# Patient Record
Sex: MALE | Race: WHITE
[De-identification: names, ages, dates, MRNs, and addresses within clinical notes are randomized per-mention and may not be internally consistent; named-entity substitution may affect disease eponyms.]

---

## 2019-06-26 ENCOUNTER — HOSPITAL ENCOUNTER (EMERGENCY)
Dept: HOSPITAL 25 - UCCORT | Age: 79
Discharge: ANOTHER HOSPITAL | End: 2019-06-26
Payer: MEDICARE

## 2019-06-26 VITALS — DIASTOLIC BLOOD PRESSURE: 67 MMHG | SYSTOLIC BLOOD PRESSURE: 136 MMHG

## 2019-06-26 DIAGNOSIS — I10: ICD-10-CM

## 2019-06-26 DIAGNOSIS — M79.602: Primary | ICD-10-CM

## 2019-06-26 PROCEDURE — 99203 OFFICE O/P NEW LOW 30 MIN: CPT

## 2019-06-26 PROCEDURE — 93005 ELECTROCARDIOGRAM TRACING: CPT

## 2019-06-26 PROCEDURE — G0463 HOSPITAL OUTPT CLINIC VISIT: HCPCS

## 2019-06-26 NOTE — XMS REPORT
Continuity of Care Document (CCD)

 Created on:May 31, 2019



Patient:Lester De Souza

Sex:Male

:1940

External Reference #:[id number]





Demographics







 Address  [address] [place] [address]

 

 Home Phone  [phone]

 

 Mobile Phone  [phone]

 

 Preferred Language  en

 

 Marital Status  Not  or 

 

 Sikhism Affiliation  Unknown

 

 Race  White

 

 Ethnic Group  Not  or 









Author







 Name  Johny Webster FNP

 

 Address  [address] [place] [id number]









Support







 Name  Relationship  Address  Phone

 

 Fany Pond  Family Member  Unavailable  [phone]

 

 Aida Pond  Domestic/Life Partner  Unavailable  [phone]









Care Team Providers







 Name  Role  Phone

 

 Johny Webster NP  Care Team Information   Unavailable

 

 Johny Webster NP  Primary Care Physician  Unavailable









Payers







 Date  Identification Numbers  Payment Provider  Subscriber

 

   Policy Number: [id number]  [employer]  Lester De Souza









 PayID: [id number]  [address]









 [place] [address]









 Expires: 2018  Policy Number: [id number]  [facility] Plan [facility]  Lester De Souza









 PayID: [id number]  [address]









 [place] [address]







Problems







 Active Problems  Provider  Date

 

 Hypothyroidism  Johny Webster FNP  Onset: 2014

 

 Benign essential hypertension  Johny Webster FNP  Onset: 2014

 

 Thrombocytopenic disorder  Amie Joseph FNP  Onset: 2014

 

 Gastroesophageal reflux disease  Amie Joseph FNP  Onset: 2014

 

 Chronic kidney disease stage 3  Johny Webster FNP  Onset: 2017









 Note: Lab: 17 - Comprehensive Metabolic Panel









 Hypercalcemia  Daxa Chin MD  Onset: 2018

 

 Gastro-esophageal reflux disease with  Julius Kee MD,FACS  Onset: 2018



 esophagitis    

 

 Diaphragmatic hernia  Herve Ackerman MD  Onset: 2018

 

 Heartburn  Julius Kee MD,FACS  Onset: 2018









 Resolved Problems









 Disorder of peritoneum  Amie Joseph FNP  Onset: 2014









 Resolved: 2015









 Decreased renal function  Johny Webster CRISTOFER  Onset: 2016









 Resolved: 2017

 

 Note: BUN 27, creatine 1.5







Family History







 Date  Family Member(s)  Observation  Comments

 

   General  Non Contributory  

 

   Father  Multiple Sclerosis  

 

   Mother  No Current Problems  







Social History







 Type  Date  Description  Comments

 

 Birth Sex    Unknown  

 

 Marital Status    Single  

 

 Lives With    Wife  signficant other- is



       not 

 

 Home Environment    Lives With  girl friend

 

 Diet    Patient follows no  



     dietary restrictions  

 

 Occupation    Retired  

 

 Occupation    [employer]  

 

 Work Status    Retired  

 

 Tobacco Use  Start: Unknown End:  Former Cigarette Smoker  started age 16 - 30 
+



   Unknown    pack year history -



       now smoking 8+ pipes



       a day

 

 Smoking Status  Reviewed: 19  Former Cigarette Smoker  started age 16 - 
30 +



       pack year history -



       now smoking 8+ pipes



       a day

 

 Smokeless Tobacco    Former Smokeless  



     Tobacco User, Used 10  



     Times Daily  

 

 ETOH Use    Occasionally consumes  drink a few times a



     alcohol  month

 

 Tobacco Use  Start: Unknown  Patient is a current  



     smoker, smokes every  



     day  

 

 Recreational Drug Use    Denies Drug Use  

 

 Tobacco Use  Start: Unknown  smokes a pipe  







Allergies, Adverse Reactions, Alerts







 Description

 

 No Known Drug Allergies







Medications







 Active Medications  SIG  Qnty  Indications  Ordering  Date



         Provider  

 

 Omeprazole  1 by mouth every  30caps  K21.9  Tyron,  



      20mg Capsules DR rochelle Edgar MD  9



           

 

 Losartan Potassium  1 by mouth every  90tabs    Gallito Perez,  



              50mg Tablets  day      MD  8



           

 

 Levothyroxine Sodium  1 by mouth every  30tabs  E03.9  [place],  



                75mcg  day      Jenniferleigh,  7



 Tablets        FNP  

 

 Viagra  use one hour prior  9tabs    [facility],  



  100mg Tablets  to sexual      Jennifer[place],  6



   intercourse      FNP  

 

 Metoprolol Succinate ER  1 by mouth every  30tabs    [place],  



                   50mg  day      Jenniferleigh,  4



 Tablets ER 24HR        FNP  



           

 

 Hydrochlorothiazide  1 by mouth every  30tabs    [place],  



               25mg  day      Jenniferleigh,  0



 Tablets        FNP  

 

 Dok  take one capsule  60caps    [place],  



 100mg Capsules  by mouth twice a      Jenniferleigh,  0



   day as needed      FNP  

 

 Miralax  1 packet by mouth      Unknown  



   3350NF Packet  daily as needed        0



   for constipation        

 

 Ranitidine 150 Maximum  1 tab by mouth  30tabs    [place],  



 Strength  once a day      Jenniferleigh,  0



    150mg Tablets        FNP  



           









 History Medications









 Omeprazole  take 1 capsule by  30caps  K22.10  Gallito Perez  2018 -



          40mg  mouth once daily      MD  2019



 Capsules DR Burgess  1 by mouth every day  90tabs    Elsy,  2018 -



         80mg Tablets        MD Daxa  2018



           

 

 Miralax  1 tablespoon in 8  510gm  K59.00  [place],  2018 -



       3350NF Powder  ounces of water once      [facility]  2018



   to twice  a day      , FNP  

 

 Colace  one once a day for  90caps  K59.00  [place],  2018 -



      100mg Capsules  constipation      [facility]  2018



         , FNP  

 

 Ranitidine HCL  1 by mouth twice  60tabs    [place],  05/10/2017 -



              150mg  daily for acid      [facility]  2018



 Tablets  reflux      , FNP  



           

 

 Levothyroxine Sodium  take 1 tablet by  90tabs  E03.9  [place],  2016 -



   mouth once daily      [facility]  2017



 50mcg Tablets        , FNP  



           

 

 Omeprazole  1 by mouth every day  90caps  K21.9  [place],  2016 -



          40mg        [facility]  05/10/2017



 Capsules         , FNP  



           

 

 Zostavax  to be given  1units    [place],  10/22/2015 -



         [facility]  Unknown



 57512Ebe/0.65ML        , FNP  



 Solution Rec          



           

 

 Cialis  1 by mouth every day  4tabs  N52.2  [place],  2015 -



      5mg Tablets  (*in place of      Jennifer[place]  Unknown



   viagra*)      , FNP  









 Z76.0









 Hydrochlorothiazide  take 1 tablet by  90tabs  I10  [place],  2015 -



                 25mg  mouth once daily      [facility],  2018



 Tablets        FNP  



           

 

 Prednisone  3 tabs daily  10tabs    [place]  2015 -



        20mg Tablets        MD Analia  2015



           

 

 Viagra  one tablet by  6tabs  607.84  Adan,  2014 -



    50mg Tablets  mouth 1 hour      MD Analia  2015



   prior to sexual        



   intercourse        

 

 Naproxen Sodium  otc as per      [place],  2014 -



             220mg  package      MD Analia  10/22/2015



 Capsules  directions as        



   needed        

 

 Calcium 500/D  2 tablets po qday  60tabs    [place],  2014 -



         MD Analia  2018



 500-200mg-Unit Tablets          



           

 

 Ferrous Sulfate  1 po bid  30tabs    [place],  2014 -



             325(65Fe)        MD Analia  Unknown



 mg Tablets          



           

 

 Levothyroxine Sodium  1 by mouth every  30tabs  E03.9  [place],  2014 -



                  25mcg  rochelle Mcclain,  2016



 Tablets        FNP  



           

 

 Omeprazole  1 by mouth every  90caps  K21.9  [facility],  2014 -



        20mg Capsules DR rochelle Mcclain,  2016



         FNP  

 

 Sennosides  1po bid  120tabs    Adan,  2014 -



        8.6mg Tablets        MD Analia  Unknown



           

 

 Prednisone  PO Daily - as per      Unknown   -



        5mg Tablets  Hem/Onc 5/19/15        2015



           

 

 Calcium 600+D High  2 by mouth every      Unknown   -



 Potency  day->holding due        2018



     600-400mg-Unit  to hypercalcemia        



 Tablets          



           







Immunizations







 CPT Code  Status  Date  Vaccine  Lot #

 

 [id number]  Given  2018  Influenza High Dose  [id number]

 

 99210  Given  2017  Influenza High Dose  [id number]

 

   Given  2016  Influenza Vaccine (Fluzone) Age 3 And Older  [id number]

 

 77962  Given  2016  Pneumococcal Conjugate Vaccine 13 Valent For  [id number]



       Intramuscular Use  

 

 20793  Given  10/22/2015  Pneumovax Injection  [id number]

 

   Given  10/12/2015  Influenza Vaccine (Fluzone) Age 3 And Older  [id number]







Vital Signs







 Date  Vital  Result  Comment

 

 2019 10:02am  BP Systolic  90 mmHg  









 BP Diastolic  62 mmHg  

 

 Heart Rate  86 /min  

 

 Respiratory Rate  18 /min  

 

 Height  68 inches  5'8"

 

 Weight  206.00 lb  

 

 BMI (Body Mass Index)  31.3 kg/m2  

 

 BSA (Body Surface Area)  2.07 m2  

 

 Ideal body weight in kilograms  70 kg  

 

 O2 % BldC Oximetry  98 %  









 2019  8:35am  BP Systolic Sitting Left Arm  100 mmHg  









 BP Diastolic Sitting Left Arm  56 mmHg  

 

 Heart Rate  78 /min  

 

 Respiratory Rate  16 /min  

 

 Height  68 inches  5'8"

 

 Weight  206.00 lb  

 

 BMI (Body Mass Index)  31.3 kg/m2  

 

 BSA (Body Surface Area)  2.07 m2  

 

 Ideal body weight in kilograms  70 kg  

 

 O2 % BldC Oximetry  98 %  Ra









 2019  9:54am  BP Systolic Sitting Right Arm  110 mmHg  









 BP Diastolic Sitting Right Arm  68 mmHg  

 

 Body Temperature  97.2 F  

 

 Heart Rate  63 /min  

 

 Respiratory Rate  18 /min  

 

 Height  68 inches  5'8"

 

 Weight  206.00 lb  

 

 BMI (Body Mass Index)  31.3 kg/m2  

 

 BSA (Body Surface Area)  2.07 m2  

 

 Ideal body weight in kilograms  70 kg  

 

 O2 % BldC Oximetry  97 %  Ra









 2018  2:46pm  BP Systolic  119 mmHg  









 BP Diastolic  72 mmHg  

 

 Heart Rate  66 /min  

 

 Respiratory Rate  17 /min  

 

 Height  68 inches  5'8"

 

 Weight  202.00 lb  

 

 BMI (Body Mass Index)  30.7 kg/m2  

 

 BSA (Body Surface Area)  2.05 m2  

 

 Ideal body weight in kilograms  70 kg  

 

 O2 % BldC Oximetry  93 %  









 2018  2:38pm  BP Systolic Sitting Right Arm  122 mmHg  









 BP Diastolic Sitting Right Arm  70 mmHg  

 

 Body Temperature  97.4 F  

 

 Heart Rate  67 /min  

 

 Respiratory Rate  22 /min  

 

 Height  68 inches  5'8"

 

 Weight  199.12 lb  

 

 BMI (Body Mass Index)  30.3 kg/m2  

 

 BSA (Body Surface Area)  2.04 m2  

 

 Ideal body weight in kilograms  70 kg  

 

 O2 % BldC Oximetry  90 %  









 2018  8:28am  BP Systolic Sitting Left Arm  110 mmHg  









 BP Diastolic Sitting Left Arm  60 mmHg  

 

 Heart Rate  72 /min  

 

 Respiratory Rate  16 /min  

 

 Height  68 inches  5'8"

 

 Weight  201.00 lb  

 

 BMI (Body Mass Index)  30.6 kg/m2  

 

 BSA (Body Surface Area)  2.05 m2  

 

 Ideal body weight in kilograms  70 kg  









 10/10/2018 10:25am  BP Systolic Sitting Left Arm  110 mmHg  









 BP Diastolic Sitting Left Arm  68 mmHg  

 

 Body Temperature  97.8 F  

 

 Heart Rate  61 /min  

 

 Height  68 inches  5'8"

 

 Weight  194.00 lb  

 

 BMI (Body Mass Index)  29.5 kg/m2  

 

 BSA (Body Surface Area)  2.02 m2  

 

 Ideal body weight in kilograms  70 kg  

 

 O2 % BldC Oximetry  97 %  









 2018 10:03am  BP Systolic Sitting Right Arm  116 mmHg  









 BP Diastolic Sitting Right Arm  70 mmHg  

 

 Heart Rate  67 /min  

 

 Respiratory Rate  14 /min  

 

 Height  68 inches  5'8"

 

 Weight  192.50 lb  

 

 BMI (Body Mass Index)  29.3 kg/m2  

 

 BSA (Body Surface Area)  2.01 m2  

 

 Ideal body weight in kilograms  70 kg  

 

 O2 % BldC Oximetry  97 %  









 2018  8:41am  BP Systolic Sitting Left Arm  103 mmHg  









 BP Diastolic Sitting Left Arm  64 mmHg  

 

 Heart Rate  65 /min  

 

 Respiratory Rate  16 /min  

 

 Height  68 inches  5'8"

 

 Weight  189.00 lb  

 

 BMI (Body Mass Index)  28.7 kg/m2  

 

 BSA (Body Surface Area)  2.00 m2  

 

 Ideal body weight in kilograms  70 kg  









 2018  8:54am  BP Systolic Sitting Left Arm  100 mmHg  









 BP Diastolic Sitting Left Arm  60 mmHg  

 

 Heart Rate  80 /min  

 

 Respiratory Rate  16 /min  

 

 Height  68 inches  5'8"

 

 Weight  188.00 lb  

 

 BMI (Body Mass Index)  28.6 kg/m2  

 

 BSA (Body Surface Area)  1.99 m2  

 

 Ideal body weight in kilograms  70 kg  









 05/10/2018 11:00am  BP Systolic Sitting Left Arm  110 mmHg  









 BP Diastolic Sitting Left Arm  64 mmHg  

 

 Heart Rate  70 /min  

 

 Respiratory Rate  16 /min  

 

 Height  68 inches  5'8"

 

 Weight  194.00 lb  

 

 BMI (Body Mass Index)  29.5 kg/m2  

 

 BSA (Body Surface Area)  2.02 m2  

 

 Ideal body weight in kilograms  70 kg  









 2018 10:44am  BP Systolic Sitting Left Arm  118 mmHg  









 BP Diastolic Sitting Left Arm  64 mmHg  

 

 Heart Rate  76 /min  

 

 Respiratory Rate  18 /min  

 

 Height  68 inches  5'8"

 

 Weight  197.50 lb  

 

 BMI (Body Mass Index)  30.0 kg/m2  

 

 BSA (Body Surface Area)  2.03 m2  

 

 Ideal body weight in kilograms  70 kg  









 2018 11:38am  BP Systolic Sitting Left Arm  115 mmHg  









 BP Diastolic Sitting Left Arm  56 mmHg  

 

 Heart Rate  67 /min  

 

 Respiratory Rate  12 /min  

 

 Height  68 inches  5'8"

 

 Weight  199.00 lb  

 

 BMI (Body Mass Index)  30.3 kg/m2  

 

 BSA (Body Surface Area)  2.04 m2  

 

 Ideal body weight in kilograms  70 kg  









 2018  3:00pm  BP Systolic Sitting Right Arm  132 mmHg  









 BP Diastolic Sitting Right Arm  79 mmHg  

 

 Heart Rate  82 /min  

 

 Respiratory Rate  16 /min  

 

 Height  68 inches  5'8"

 

 Weight  196.00 lb  

 

 BMI (Body Mass Index)  29.8 kg/m2  

 

 BSA (Body Surface Area)  2.03 m2  

 

 Ideal body weight in kilograms  70 kg  









 2018 10:11am  BP Systolic Sitting Left Arm  128 mmHg  









 BP Diastolic Sitting Left Arm  72 mmHg  

 

 Heart Rate  80 /min  

 

 Respiratory Rate  18 /min  

 

 Height  68 inches  5'8"

 

 Weight  201.12 lb  

 

 BMI (Body Mass Index)  30.6 kg/m2  

 

 BSA (Body Surface Area)  2.05 m2  

 

 Ideal body weight in kilograms  70 kg  









 2018  8:29am  BP Systolic Sitting Left Arm  124 mmHg  









 BP Diastolic Sitting Left Arm  72 mmHg  

 

 Heart Rate  72 /min  

 

 Respiratory Rate  18 /min  

 

 Height  68 inches  5'8"

 

 Weight  198.38 lb  

 

 BMI (Body Mass Index)  30.2 kg/m2  

 

 BSA (Body Surface Area)  2.04 m2  

 

 Ideal body weight in kilograms  70 kg  









 2017  1:32pm  BP Systolic Sitting Left Arm  120 mmHg  









 BP Diastolic Sitting Left Arm  78 mmHg  

 

 Heart Rate  66 /min  

 

 Height  68 inches  5'8"

 

 Weight  203.00 lb  

 

 BMI (Body Mass Index)  30.9 kg/m2  

 

 BSA (Body Surface Area)  2.06 m2  

 

 Ideal body weight in kilograms  70 kg  









 05/10/2017  1:20pm  BP Systolic  131 mmHg  









 BP Diastolic  76 mmHg  

 

 Body Temperature  97.6 F  

 

 Heart Rate  77 /min  

 

 Respiratory Rate  20 /min  

 

 Height  68 inches  5'8"

 

 Weight  200.00 lb  with boots

 

 BMI (Body Mass Index)  30.4 kg/m2  

 

 BSA (Body Surface Area)  2.04 m2  

 

 Ideal body weight in kilograms  70 kg  

 

 O2 % BldC Oximetry  97 %  









 2016  1:07pm  BP Systolic Sitting Left Arm  124 mmHg  









 BP Diastolic Sitting Left Arm  74 mmHg  

 

 Heart Rate  76 /min  

 

 Respiratory Rate  18 /min  

 

 Height  68 inches  5'8"

 

 Weight  197.00 lb  

 

 BMI (Body Mass Index)  30.0 kg/m2  

 

 BSA (Body Surface Area)  2.03 m2  

 

 Ideal body weight in kilograms  70 kg  









 2016  1:06pm  BP Systolic Sitting Left Arm  126 mmHg  









 BP Diastolic Sitting Left Arm  70 mmHg  

 

 Heart Rate  72 /min  

 

 Respiratory Rate  18 /min  

 

 Height  68 inches  5'8"

 

 Weight  184.00 lb  

 

 BMI (Body Mass Index)  28.0 kg/m2  

 

 BSA (Body Surface Area)  1.97 m2  

 

 Ideal body weight in kilograms  70 kg  









 2016  1:14pm  BP Systolic  124 mmHg  









 BP Diastolic  76 mmHg  

 

 Height  68 inches  5'8"

 

 Weight  197.38 lb  

 

 BMI (Body Mass Index)  30.0 kg/m2  

 

 BSA (Body Surface Area)  2.03 m2  









 2015  8:43am  BP Systolic Sitting Left Arm  134 mmHg  









 BP Diastolic Sitting Left Arm  82 mmHg  

 

 Heart Rate  79 /min  

 

 Respiratory Rate  18 /min  

 

 Height  68 inches  5'8"

 

 Weight  210.00 lb  

 

 BMI (Body Mass Index)  31.9 kg/m2  

 

 BSA (Body Surface Area)  2.09 m2  









 10/22/2015  2:17pm  BP Systolic  126 mmHg  









 BP Diastolic  76 mmHg  

 

 Heart Rate  78 /min  

 

 Respiratory Rate  18 /min  

 

 Height  68 inches  5'8"

 

 Weight  204.00 lb  

 

 BMI (Body Mass Index)  31.0 kg/m2  

 

 BSA (Body Surface Area)  2.06 m2  









 2015  1:10pm  BP Systolic  152 mmHg  148/88 on repeat, sitt









 BP Diastolic  88 mmHg  148/88 on repeat, sitt

 

 Heart Rate  64 /min  

 

 Respiratory Rate  20 /min  

 

 Weight  206.50 lb  









 2015  1:47pm  BP Systolic  114 mmHg  









 BP Diastolic  72 mmHg  

 

 Weight  208.00 lb  









 2014  9:36am  BP Systolic  134 mmHg  









 BP Diastolic  86 mmHg  

 

 Weight  194.00 lb  









 2014 11:19am  BP Systolic  118 mmHg  









 BP Diastolic  76 mmHg  

 

 Weight  194.00 lb  









 2014  1:06pm  BP Systolic  126 mmHg  









 BP Diastolic  74 mmHg  

 

 Weight  194.00 lb  









 2014  1:08pm  BP Systolic  126 mmHg  









 BP Diastolic  74 mmHg  

 

 Heart Rate  80 /min  

 

 Respiratory Rate  18 /min  

 

 Height  68 inches  5'8"

 

 Weight  191.00 lb  









 2014  1:19pm  BP Systolic  130 mmHg  









 BP Diastolic  70 mmHg  

 

 Body Temperature  98.7 F  

 

 Heart Rate  60 /min  

 

 Height  68 inches  5'8"









 2014  1:43pm  BP Systolic  160 mmHg  









 BP Diastolic  100 mmHg  

 

 Body Temperature  99.1 F  

 

 Heart Rate  80 /min  

 

 Height  68 inches  5'8"

 

 Weight  179.00 lb  









 2014  1:20pm  BP Systolic  150 mmHg  









 BP Diastolic  80 mmHg  

 

 Body Temperature  99.0 F  

 

 Heart Rate  80 /min  

 

 Height  68 inches  5'8"

 

 Weight  186.00 lb  







Results







 Test  Date  Facility  Test  Result  H/L  Range  Note

 

 Basic Metabolic Panel  2019  [facility]  Glucose  104 mg/dL  N    1



     [address] [place], [place] [id number] [phone]          









 BUN  26 mg/dL  High  7-18  

 

 Creatinine  1.6 mg/dL  High  0.6-1.3  

 

 Glom Filtration Rate, Estimate  45 mL/min    >60  

 

 If   54 mL/min    >60  2

 

 BUN/Creat  16.2 ratio      

 

 Sodium  139 mmol/L  N  136-145  

 

 Potassium  4.5 mmol/L  N  3.5-5.1  

 

 Chloride  105 mmol/L  N    

 

 Carbon Dioxide  27 mmol/L  N  21-32  

 

 Anion Gap  7 mEq/L  Low  8-16  

 

 Calcium  9.3 mg/dL  N  8.5-10.1  









 Laboratory test  2019  [facility]  Phosphorous  2.7 mg/dL  N  2.5-4.0  



 finding    [address] [place], [place] [address] [phone]          









 Magnesium  2.4 mg/dL  N  1.8-2.4  









 Protein/Creatinine  2019  [facility]  Creatinine,Urine  130.8    Not  



 Ratio,Urine    [address]    mg/dL    [facility].  



     [place], [place] [address] [phone]          









 Protein,Total,Urine  11.4 mg/dL    Not Estab.  

 

 Protein/Creatinine Ratio  87 MG/GCRE    0-200  3









 Ua RFX Micro & Culture  2019  [facility]  Urine Color  YELLOW    Yellow  



 II    [address] [place], [place] [address] [phone]          









 Urine Clarity  CLEAR    Clear  

 

 Urine Glucose - Dipstick  NEGATIVE mg/dL    Negative  

 

 Urine Bilirubin - Dipstick  NEGATIVE    Negative  

 

 Urine Ketone  NEGATIVE mg/dL    Negative  

 

 Urine Specific Gravity  1.020  N  1.010-1.030  

 

 Urine Blood  NEGATIVE    Negative  

 

 Urine PH  6.0  Low  6.5-7.5  

 

 Urine Protein - Dipstick  NEGATIVE mg/dL    Negative  

 

 Urine Urobilinogen - Dipstick  0.2 E.U./dL  N  0.2-1.0  

 

 Urine Nitrite - Dipstick  NEGATIVE    Negative  

 

 Urine Leuk Esterase  NEGATIVE    Negative  

 

 Source:  URINE, CLEAN CAT <SEE NOTE>      4









 Laboratory test  2019  [facility]  Vitamin  30.5    30.0-100.0  5



 finding    [address]  D,25-Hydroxy  ng/mL      



     [place] [address]          



     [phone]          

 

 Laboratory test  2019  [facility] [address]  Thyroid Stim  4.05  N  0.30-
4.20  6



 finding    [address]  Hormone  uIU/mL      



     [place] [address]          



     [phone]          

 

 Direct LDL  2019  [facility] [address]  LDL Chol.  115  High  0-99  7



 Cholesterol    [address]  (Direct)  mg/dL      



     [place] [address]          



     [phone]          

 

 Comprehensive  10/29/2018  [facility]  Glucose  107  High    



 Metabolic Panel    [address]    mg/dL      



     [place] [address]          



     [phone]          









 BUN  22 mg/dL  High  7-18  

 

 Creatinine  1.5 mg/dL  High  0.6-1.3  

 

 Glom Filtration Rate, Estimate  48 mL/min    >60  

 

 If   58 mL/min    >60  8

 

 BUN/Creat  14.6 ratio      

 

 Sodium  141 mmol/L  N  136-145  

 

 Potassium  4.2 mmol/L  N  3.5-5.1  

 

 Chloride  108 mmol/L  High    

 

 Carbon Dioxide  26 mmol/L  N  21-32  

 

 Anion Gap  7 mEq/L  Low  8-16  

 

 Calcium  8.6 mg/dL  N  8.5-10.1  

 

 Total Protein  7.2 g/dL  N  6.4-8.2  

 

 Albumin  3.7 g/dL  N  3.4-5.0  

 

 Globulin  3.5 g/dL  N  1.9-4.3  

 

 Alb/Glob  1.1 ratio      

 

 Bilirubin,Total  0.5 mg/dL  N  0.2-1.0  

 

 Sgot/Ast  19 U/L  N  15-37  

 

 SGPT/Alt  27 U/L  N  12-78  

 

 Alkaline Phosphatase  51 U/L  N    









 Laboratory test  10/29/2018  [facility]  Vitamin  33.4 ng/mL    30.0-100.0  9



 finding    [address]  D,25-Hydroxy        



     [place] [address]          



     [phone]          

 

 Ua RFX Micro &  10/29/2018  [facility]  Urine Color  YELLOW    Yellow  



 Culture II    [address]          



     [place] [address] [phone]          









 Urine Clarity  CLEAR    Clear  

 

 Urine Glucose - Dipstick  NEGATIVE mg/dL    Negative  

 

 Urine Bilirubin - Dipstick  NEGATIVE    Negative  

 

 Urine Ketone  NEGATIVE mg/dL    Negative  

 

 Urine Specific Gravity  1.015  N  1.010-1.030  

 

 Urine Blood  NEGATIVE    Negative  

 

 Urine PH  6.5  N  6.5-7.5  

 

 Urine Protein - Dipstick  NEGATIVE mg/dL    Negative  

 

 Urine Urobilinogen - Dipstick  0.2 E.U./dL  N  0.2-1.0  

 

 Urine Nitrite - Dipstick  NEGATIVE    Negative  

 

 Urine Leuk Esterase  NEGATIVE    Negative  









 Protein/Creatinine  10/29/2018  [facility]  Creatinine,Urine  123.2    Not  



 Ratio,Urine    [address]    mg/dL    [place]  



     [place] [address] [phone]          









 Protein,Total,Urine  10.8 mg/dL    Not Estab.  

 

 Protein/Creatinine Ratio  88 MG/GCRE    0-200  10









 Laboratory test  10/29/2018  [facility]  Phosphorous  2.6 mg/dL  N  2.5-4.0  



 finding    [address]          



     [place] [address] [phone]          









 PTH,Intact  36 pg/mL    15-65  11

 

 Magnesium  2.2 mg/dL  N  1.8-2.4  









 Basic Metabolic Panel  2018  [facility]  Glucose  89 mg/dL  N    



     [address]          



     [place] [address] [phone]          









 BUN  22 mg/dL  High  7-18  

 

 Creatinine  1.4 mg/dL  High  0.6-1.3  

 

 Glom Filtration Rate, Estimate  52 mL/min    >60  

 

 If African American  >60 mL/min    >60  12

 

 BUN/Creat  15.7 ratio      

 

 Sodium  142 mmol/L  N  136-145  

 

 Potassium  4.3 mmol/L  N  3.5-5.1  

 

 Chloride  108 mmol/L  High    

 

 Carbon Dioxide  27 mmol/L  N  21-32  

 

 Anion Gap  7 mEq/L  Low  8-16  

 

 Calcium  8.9 mg/dL  N  8.5-10.1  









 Laboratory test  2018  [facility]  Vitamin  40.6    30.0-100.0  13,



 finding    [address]  D,25-Hydroxy  ng/mL      14



     [place] [address] [phone]          

 

 Comprehensive  2018  [facility]  Glucose  93 mg/dL  N    



 Metabolic Panel    [address]          



     [place] [address] [phone]          









 BUN  23 mg/dL  High  7-18  

 

 Creatinine  1.5 mg/dL  High  0.6-1.3  

 

 Glom Filtration Rate, Estimate  48 mL/min    >60  

 

 If   58 mL/min    >60  15

 

 BUN/Creat  15.3 ratio      

 

 Sodium  139 mmol/L  N  136-145  

 

 Potassium  4.1 mmol/L  N  3.5-5.1  

 

 Chloride  104 mmol/L  N    

 

 Carbon Dioxide  29 mmol/L  N  21-32  

 

 Anion Gap  6 mEq/L  Low  8-16  

 

 Calcium  10.3 mg/dL  High  8.5-10.1  

 

 Total Protein  7.2 g/dL  N  6.4-8.2  

 

 Albumin  4.0 g/dL  N  3.4-5.0  

 

 Globulin  3.2 g/dL  N  1.9-4.3  

 

 Alb/Glob  1.3 ratio      

 

 Bilirubin,Total  0.5 mg/dL  N  0.2-1.0  

 

 Sgot/Ast  19 U/L  N  15-37  

 

 SGPT/Alt  21 U/L  N  12-78  

 

 Alkaline Phosphatase  46 U/L  N    









 Ua RFX Micro & Culture  2018  [facility]  Urine Color  YELLOW    Yellow  



 II    [address] [place] [address] [phone]          









 Urine Clarity  CLEAR    Clear  

 

 Urine Glucose - Dipstick  NEGATIVE mg/dL    Negative  

 

 Urine Bilirubin - Dipstick  NEGATIVE    Negative  

 

 Urine Ketone  NEGATIVE mg/dL    Negative  

 

 Urine Specific Gravity  1.010  N  1.010-1.030  

 

 Urine Blood  NEGATIVE    Negative  

 

 Urine PH  7.5  N  6.5-7.5  

 

 Urine Protein - Dipstick  NEGATIVE mg/dL    Negative  

 

 Urine Urobilinogen - Dipstick  0.2 E.U./dL  N  0.2-1.0  

 

 Urine Nitrite - Dipstick  NEGATIVE    Negative  

 

 Urine Leuk Esterase  NEGATIVE    Negative  

 

 Source:  URINE, CLEAN CAT <SEE NOTE>      16









 Protein/Creatinine  2018  [facility]  Creatinine,Urine  86.1    Not  



 Ratio,Urine    [address]    mg/dL    Estab.  



     [place] [address] [phone]          









 Protein,Total,Urine  10.8 mg/dL    Not Estab.  

 

 Protein/Creatinine Ratio  125 MG/GCRE    0-200  17









 Laboratory test  2018  [facility]  Phosphorous  3.5 mg/dL  N  2.5-4.0  



 finding    [address]          



     [place] [address]          



     [phone]          









 PTH,Intact  16 pg/mL    15-65  18

 

 Magnesium  2.0 mg/dL  N  1.8-2.4  









 Laboratory test  2018  [facility]  Thyroid Stim  2.98 uIU/mL  N  
0.30-4.20  



 finding    [address] [place] [address]  Hormone        



     [place] [address]          



     [phone]          

 

 Basic Metabolic  2018  [facility] [address]  Glucose  101 mg/dL  N    



 Panel    [address] [place] [address]          



     [phone]          









 BUN  25 mg/dL  High  7-18  

 

 Creatinine  1.7 mg/dL  High  0.6-1.3  

 

 Glom Filtration Rate, Estimate  42 mL/min    >60  

 

 If   51 mL/min    >60  19

 

 BUN/Creat  14.7 ratio      

 

 Sodium  142 mmol/L  N  136-145  

 

 Potassium  4.2 mmol/L  N  3.5-5.1  

 

 Chloride  106 mmol/L  N    

 

 Carbon Dioxide  30 mmol/L  N  21-32  

 

 Anion Gap  6 mEq/L  Low  8-16  

 

 Calcium  10.2 mg/dL  High  8.5-10.1  









 Laboratory test  2017  [facility] [address]  Thyroid  5.87  High  0.30-4.20  
20



 finding    [address] [place] [address]  Stim  uIU/mL      



     [place] [address]  Hormone        



     [phone]          

 

 Comprehensive  2017  [facility] [address]  Glucose  83 mg/dL  N    



 Metabolic Panel    [address] [place] [id number]          



     [phone]          









 BUN  28 mg/dL  High  7-18  

 

 Creatinine  1.5 mg/dL  High  0.6-1.3  

 

 Glom Filtration Rate, Estimate  48 mL/min    >60  

 

 If   58 mL/min    >60  21

 

 BUN/Creat  18.6 ratio      

 

 Sodium  138 mmol/L  N  136-145  

 

 Potassium  4.1 mmol/L  N  3.5-5.1  

 

 Chloride  107 mmol/L  N    

 

 Carbon Dioxide  26 mmol/L  N  21-32  

 

 Anion Gap  5 mEq/L  Low  8-16  

 

 Calcium  9.0 mg/dL  N  8.5-10.1  

 

 Total Protein  7.5 g/dL  N  6.4-8.2  

 

 Albumin  3.9 g/dL  N  3.4-5.0  

 

 Globulin  3.6 g/dL  N  1.9-4.3  

 

 Alb/Glob  1.1 ratio      

 

 Bilirubin,Total  0.4 mg/dL  N  0.2-1.0  

 

 Sgot/Ast  17 U/L  N  15-37  

 

 SGPT/Alt  26 U/L  N  12-78  

 

 Alkaline Phosphatase  46 U/L  N    









 CBS W/Automated Diff  2017  [facility] [address]  White Blood  8.4 K/uL  N  
3.4-10.5  



     [address]  Count        



     [place] [address] [phone]          









 Red Blood Count  5.14 M/uL  N  4.20-5.80  

 

 Hemoglobin  14.2 gm/dL  N  12.8-17.0  

 

 Hematocrit  43.7 %  N  38.0-48.0  

 

 Mean Cell Volume  85.0 fl  N  80.0-96.0  

 

 Mean Corpuscular HGB  27.6 pg  N  27.0-33.0  

 

 Mean Corpuscular HGB Conc  32.5 g/dL  N  31.7-36.0  

 

 Platelet Count  223 K/uL  N  150-400  

 

 Red Cell Distri Width SD  46.8 fl  N  36-51  

 

 Red Cell Distri Width %CV  15.3 %  N  11.6-15.8  

 

 Mean Platelet Volume  10.5 fL  N  6.6-10.6  

 

 Neut%  49.1 %  N  33.0-73.0  

 

 Lymph %  31.2 %  N  20.0-42.0  

 

 Mono %  14.1 %  High  0.0-10.0  

 

 Eo%  5.0 %  N  0.0-6.6  

 

 Bas%  0.6 %  N  0.0-1.1  

 

 Neut#  4.15 K/uL  N  1.8-7.0  

 

 Lymph #  2.63 K/uL  N  1.0-4.0  

 

 Thurston #  1.19 K/uL  High  0.0-0.8  

 

 Eos #  0.42 K/uL  N  0.0-0.5  

 

 Baso #  0.05 K/uL  N  0.0-0.1  









 Basic Metabolic Panel  05/10/2017  [facility]  Glucose  148 mg/dL  High    22



     [address]          



     [place] [address] [phone]          









 BUN  24 mg/dL  High  7-18  

 

 Creatinine  1.6 mg/dL  High  0.6-1.3  

 

 Glom Filtration Rate, Estimate  45 mL/min    >60  

 

 If   54 mL/min    >60  23

 

 BUN/Creat  15.0 ratio      

 

 Sodium  141 mmol/L  N  136-145  

 

 Potassium  3.5 mmol/L  N  3.5-5.1  

 

 Chloride  105 mmol/L  N    

 

 Carbon Dioxide  25 mmol/L  N  21-32  

 

 Anion Gap  11 mEq/L  N  8-16  

 

 Calcium  9.1 mg/dL  N  8.5-10.1  









 Glycohemoglobin A1c  05/10/2017  [facility]  Glycohemoglobin  6.1 %  N  4.2-6.3  24



     [address]  (A1c)        



     [place], [place] [id number] [phone]          









 eAG  128 mg/dL      









 Ua RFX Micro & Culture  05/10/2017  [facility]  Urine Color  YELLOW    Yellow  25



 II    [address]          



     [place], [place] [id number] [phone]          









 Urine Clarity  CLEAR    Clear  

 

 Urine Glucose - Dipstick  NEGATIVE mg/dL    Negative  

 

 Urine Bilirubin - Dipstick  NEGATIVE    Negative  

 

 Urine Ketone  NEGATIVE mg/dL    Negative  

 

 Urine Specific Gravity  1.020  N  1.010-1.030  

 

 Urine Blood  NEGATIVE    Negative  

 

 Urine PH  6.0  Low  6.5-7.5  

 

 Urine Protein - Dipstick  NEGATIVE mg/dL    Negative  

 

 Urine Urobilinogen - Dipstick  0.2 E.U./dL  N  0.2-1.0  

 

 Urine Nitrite - Dipstick  NEGATIVE    Negative  

 

 Urine Leuk Esterase  NEGATIVE    Negative  

 

 Source:  URINE, CLEAN CAT <SEE NOTE>      26









 Laboratory test  05/10/2017  [facility]  Urine  12 mg/dL      



 finding    [address]  Protein,Random        



     [place], [place] [id number] [phone]          

 

 Protein/Creatin  05/10/2017  [facility]  Creatinine,Urine  123.6    Not  



 ine Ratio,Urine    [address]    mg/dL    Estab.  



     [place], [place] [id number] [phone]          









 Protein,Total,Urine  9.9 mg/dL    Not Estab.  

 

 Protein/Creatinine Ratio  80 MG/GCRE    0-200  27









 Comprehensive  2017  [facility] [address]  Glucose  108 mg/dL  High    
28



 Metabolic Panel    [address]          



     [place], [place] [id number] [phone]          









 BUN  22 mg/dL  High  7-18  

 

 Creatinine  1.6 mg/dL  High  0.6-1.3  

 

 Glom Filtration Rate, Estimate  45 mL/min    >60  

 

 If   54 mL/min    >60  29

 

 BUN/Creat  13.7 ratio      

 

 Sodium  141 mmol/L  N  136-145  

 

 Potassium  4.1 mmol/L  N  3.5-5.1  

 

 Chloride  107 mmol/L  N    

 

 Carbon Dioxide  26 mmol/L  N  21-32  

 

 Anion Gap  8 mEq/L  N  8-16  

 

 Calcium  9.1 mg/dL  N  8.5-10.1  

 

 Total Protein  7.4 g/dL  N  6.4-8.2  

 

 Albumin  3.9 g/dL  N  3.4-5.0  

 

 Globulin  3.5 g/dL  N  1.9-4.3  

 

 Alb/Glob  1.1 ratio      

 

 Bilirubin,Total  0.6 mg/dL  N  0.2-1.0  

 

 Sgot/Ast  16 U/L  N  15-37  

 

 SGPT/Alt  28 U/L  N  12-78  

 

 Alkaline Phosphatase  45 U/L  N    









 Comprehensive  2017  [facility] [address]  Glucose  107 mg/dL  High    
30



 Metabolic Panel    [address] [place], [place] [id number] [phone]          









 BUN  25 mg/dL  High  7-18  

 

 Creatinine  1.4 mg/dL  High  0.6-1.3  

 

 Glom Filtration Rate, Estimate  52 mL/min    >60  

 

 If African American  >60 mL/min    >60  31

 

 BUN/Creat  17.8 ratio      

 

 Sodium  140 mmol/L  N  136-145  

 

 Potassium  4.4 mmol/L  N  3.5-5.1  

 

 Chloride  107 mmol/L  N    

 

 Carbon Dioxide  24 mmol/L  N  21-32  

 

 Anion Gap  9 mEq/L  N  8-16  

 

 Calcium  9.2 mg/dL  N  8.5-10.1  

 

 Total Protein  7.2 g/dL  N  6.4-8.2  

 

 Albumin  3.7 g/dL  N  3.4-5.0  

 

 Globulin  3.5 g/dL  N  1.9-4.3  

 

 Alb/Glob  1.1 ratio      

 

 Bilirubin,Total  0.5 mg/dL  N  0.2-1.0  

 

 Sgot/Ast  21 U/L  N  15-37  

 

 SGPT/Alt  32 U/L  N  12-78  

 

 Alkaline Phosphatase  50 U/L  N    









 CBS W/Automated  2016  [facility] [address]  White Blood  7.2 K/uL  N  3.4-
10.5  32



 Diff    [address] [facility]  Count        



     [place] [address] [phone]          









 Red Blood Count  5.08 M/uL  N  4.20-5.80  

 

 Hemoglobin  14.5 gm/dL  N  12.8-17.0  

 

 Hematocrit  44.2 %  N  38.0-48.0  

 

 Mean Cell Volume  87.0 fl  N  80.0-96.0  

 

 Mean Corpuscular HGB  28.5 pg  N  27.0-33.0  

 

 Mean Corpuscular HGB Conc  32.8 g/dL  N  31.7-36.0  

 

 Platelet Count  249 K/uL  N  150-400  

 

 Red Cell Distri Width SD  48.2 fl  N  36-51  

 

 Red Cell Distri Width %CV  15.3 %  N  11.6-15.8  

 

 Mean Platelet Volume  10.1 fL  N  6.6-10.6  

 

 Neut%  54.3 %  N  33.0-73.0  

 

 Lymph %  27.6 %  N  17.0-56.0  

 

 Mono %  13.1 %  High  0.0-10.0  

 

 Eo%  4.6 %  N  0.0-5.0  

 

 Bas%  0.4 %  N  0.1-1.0  

 

 Neut#  3.91 K/uL  N  1.8-7.0  

 

 Lymph #  1.99 K/uL  N  1.8-7.0  

 

 [place] #  0.94 K/uL  High  0.0-0.8  

 

 Eos #  0.33 K/uL  N  0.0-0.5  

 

 Baso #  0.03 K/uL  Low  0.1-0.2  









 Comprehensive Metabolic  2016  [facility] [address]  Glucose  105 mg/dL  N  
  



 Panel    [address] [place], [place] [id number] [phone]          









 BUN  27 mg/dL  High  7-18  

 

 Creatinine  1.5 mg/dL  High  0.6-1.3  

 

 Glom Filtration Rate, Estimate  48 mL/min  N  >60  

 

 If   59 mL/min  N  >60  33

 

 BUN/Creat  18.0 ratio  N    

 

 Sodium  138 mmol/L  N  136-145  

 

 Potassium  3.8 mmol/L  N  3.5-5.1  

 

 Chloride  105 mmol/L  N    

 

 Carbon Dioxide  23 mmol/L  N  21-32  

 

 Anion Gap  10 mEq/L  N  8-16  

 

 Calcium  8.7 mg/dL  N  8.5-10.1  

 

 Total Protein  7.1 g/dL  N  6.4-8.2  

 

 Albumin  3.7 g/dL  N  3.4-5.0  

 

 Globulin  3.4 g/dL  N  1.9-4.3  

 

 Alb/Glob  1.1 ratio  N    

 

 Bilirubin,Total  0.6 mg/dL  N  0.2-1.0  

 

 Sgot/Ast  18 U/L  N  15-37  

 

 SGPT/Alt  26 U/L  N  12-78  

 

 Alkaline Phosphatase  51 U/L  N    









 LDL Cholesterol Profile  2016  [facility] [address]  Cholesterol  156 mg/dL  
N  <200  34



     [address]          



     [place], [place] [address] [phone]          









 Triglycerides  177 mg/dL  High  <150  35

 

 HDL Cholesterol  24 mg/dL  Low  >40  36

 

 LDL-Cholesterol  97 mg/dL  N  < 100  37









 Laboratory test  2016  [facility]  Thyroid Stim  3.20    0.30-4.20  



 finding    [address]  Hormone  uIU/mL      



     [place] [address]          



     [phone]          

 

 Laboratory test  10/23/2015  [facility]  Vitamin  41.5    19.9-79.3  38



 finding    [address]  D,1,25  pg/mL      



     [place] [address]  Dihydroxy        



     [phone]          

 

 Comprehensive  10/23/2015  [facility]  Glucose  110 mg/dL  High    



 Metabolic Panel    [address]          



     [place], [place] [id number] [phone]          









 BUN  23 mg/dL  High  7-18  

 

 Creatinine  1.4 mg/dL  High  0.6-1.3  

 

 Glom Filtration Rate, Estimate  53 mL/min    >60  

 

 If African American  >60 mL/min    >60  39

 

 BUN/Creat  16.4 ratio      

 

 Sodium  138 mmol/L    136-145  

 

 Potassium  3.8 mmol/L    3.5-5.1  

 

 Chloride  104 mmol/L      

 

 Carbon Dioxide  26 mmol/L    21-32  

 

 Anion Gap  8 mEq/L    8-16  

 

 Calcium  9.1 mg/dL    8.5-10.1  

 

 Total Protein  7.2 g/dL    6.4-8.2  

 

 Albumin  3.8 g/dL    3.4-5.0  

 

 Globulin  3.4 g/dL    1.9-4.3  

 

 Alb/Glob  1.1 ratio      

 

 Bilirubin,Total  1.0 mg/dL    0.2-1.0  

 

 Sgot/Ast  18 U/L    15-37  

 

 SGPT/Alt  31 U/L    12-78  

 

 Alkaline Phosphatase  57 U/L      









 LDL Cholesterol Profile  10/23/2015  [facility]  Cholesterol  159 mg/dL    <200  40



     [address]          



     [place], [place] [address] [phone]          









 Triglycerides  155 mg/dL  High  <150  41

 

 HDL Cholesterol  21 mg/dL  Low  >40  42

 

 LDL-Cholesterol  107 mg/dL    < 100  43









 Laboratory test  10/23/2015  [facility]  Thyroid Stim  3.70 uIU/mL    0.36-3.74  



 finding    [address]  Hormone        



     [place] [address] [phone]          

 

 CBS W/Automated  10/23/2015  [facility]  White Blood  8.8 K/uL    3.4-10.5  



 Diff    [address]  Count        



     [place] [address] [phone]          









 Red Blood Count  5.49 M/uL    4.20-5.80  

 

 Hemoglobin  15.6 gm/dL    12.8-17.0  

 

 Hematocrit  46.9 %    38.0-48.0  

 

 Mean Cell Volume  85.4 fl    80.0-96.0  

 

 Mean Corpuscular HGB  28.4 pg    27.0-33.0  

 

 Mean Corpuscular HGB Conc  33.3 g/dL    31.7-36.0  

 

 Platelet Count  240 K/uL    150-400  

 

 Red Cell Distri Width SD  47.7 fl    36-51  

 

 Red Cell Distri Width %CV  15.2 %    11.6-15.8  

 

 Mean Platelet Volume  9.9 fL    6.6-10.6  

 

 Neut%  60.0 %    33.0-73.0  

 

 Lymph %  25.0 %    17.0-56.0  

 

 Mono %  12.4 %  High  0.0-10.0  

 

 Eo%  2.3 %    0.0-5.0  

 

 Bas%  0.3 %    0.1-1.0  

 

 Neut#  5.27 K/uL    1.8-7.0  

 

 Lymph #  2.20 K/uL    1.8-7.0  

 

 Thurston #  1.09 K/uL  High  0.0-0.8  

 

 Eos #  0.20 K/uL    0.0-0.5  

 

 Baso #  0.03 K/uL  Low  0.1-0.2  









 Laboratory test  2014  N2N/CCD Import  Comment  See Note      44



 finding              

 

 Testosterone,Free/W  2014  N2N/CCD Import  Testosterone,%Fr  22.5 %    
9.0-46.0  



 eakly Bound      ee/Weakly BND        









 Testosterone,Free+Weakly Bound  51.8 ng/dL    40.0-250.0  45

 

 Testosterone,Serum  230 ng/dL  Low  348-1197  









 Laboratory test  2014  N2N/[facility] Import  Thyroid Stim  3.67 uIU/mL    0.49-
4.67  



 finding      Hormone        

 

 Laboratory test  2014  N2N/CCD Import  Antinuclear  Negative    .  46



 finding      Antibodies, Ifa        









 Bas%  0.5 %    0.1-1.0  

 

 Baso #  0.03 K/uL  Low  0.1-0.2  

 

 C-Reactive Protein,Quant  4.3 mg/L    0.0-4.9  

 

 Eo%  3.2 %    0.0-5.0  

 

 Eos #  0.20 K/uL    0.0-0.5  

 

 Hematocrit  44.3 %    38.0-48.0  

 

 Hemoglobin  14.8 gm/dL    12.8-17.0  

 

 Lymph #  2.15 K/uL    1.2-4.0  

 

 Lymph %  34.8 %    17.0-56.0  

 

 Mean Cell Volume  85.2 fl    80.0-96.0  

 

 Mean Corpuscular HGB  28.5 pg    27.0-33.0  

 

 Mean Corpuscular HGB Conc  33.4 g/dL    31.7-36.0  

 

 Mean Platelet Volume  11.9 fL  High  6.6-10.6  

 

 Mono #  0.76 K/uL  High  0.0-0.6  

 

 Mono %  12.3 %  High  0.0-10.0  

 

 Neut#  3.03 K/uL    1.8-7.0  

 

 Neut%  49.2 %    33.0-73.0  

 

 Platelet Count  63 K/uL  Low  150-400  

 

 Red Blood Count  5.20 M/uL    4.20-5.80  

 

 Red Cell Distri Width %CV  15.4 %    11.6-15.8  

 

 Red Cell Distri Width SD  47.5 fl    36-51  

 

 Sedimentation Rate  3 mm/hr    0-20  

 

 White Blood Count  6.2 K/uL    3.4-10.5  









 Comprehensive Metabolic Panel  2014  N2N/CCD Import  Alb/Glob  1.1 ratio
      









 Albumin  3.8 g/dL    3.5-5.0  

 

 Alkaline Phosphatase  68 U/L      

 

 Anion Gap  10 mEq/L    8-16  

 

 BUN  20 mg/dL    5-23  

 

 BUN/Creat  16.6 ratio      

 

 Bilirubin,Total  0.7 mg/dL    0.2-1.2  

 

 Calcium  8.7 mg/dL    8.5-10.1  

 

 Carbon Dioxide  24 mEq/L    18-29  

 

 Chloride  111 mmol/L  High    

 

 Creatinine  1.2 mg/dL    0.5-1.4  

 

 Globulin  3.5 g/dL    1.9-4.3  

 

 Glom Filtration Rate, Estimate  >60 mL/min    >60  

 

 Glucose  99 mg/dL      

 

 If   >60 mL/min    >60  47

 

 Potassium  3.9 mmol/L    3.5-5.1  

 

 SGPT/Alt  22 U/L  Low  30-65  

 

 Sgot/Ast  13 U/L  Low  16-40  

 

 Sodium  141 mmol/L    136-145  

 

 Total Protein  7.3 g/dL    6.3-8.0  









 CBC  2014  [employer] [facility]  Hematocrit  40.0 %    38.0-48.0  









 Hemoglobin  12.8 gm/dL    12.8-17.0  

 

 Mean Cell Volume  90.3 fl    80.0-96.0  

 

 Mean Corpuscular HGB  28.9 pg    27.0-33.0  

 

 Mean Corpuscular HGB Conc  32.0 g/dL    31.7-36.0  

 

 Mean Platelet Volume  10.2 fL    6.6-10.6  

 

 Platelet Count  123 K/uL  Low  150-400  

 

 Red Blood Count  4.43 M/uL    4.20-5.80  

 

 Red Cell Distri Width %CV  14.8 %    11.6-15.8  

 

 White Blood Count  6.5 K/uL    3.4-10.5  









 Laboratory test  2014  [employer] [facility]  Platelet Count  50 K/uL  Low  150-
400  



 finding              

 

 Laboratory test  2014  [employer] [facility]  Platelet Count  57 K/uL  Low  150-
400  



 finding              

 

 Laboratory test  2014  [employer] [facility]  Antinuclear  Negative    .  48



 finding      Antibodies, Ifa        









 Complement C3, Serum  120 mg/dL      49

 

 Complement C4, Serum  29 mg/dL    9-36  50

 

 Complement, Total (CH50)  > 63 U/mL  High  22-60  51

 

 HSV Types 1 + 2, Igm  <0.91 Ratio    0.00-0.90  52

 

 Heparin Induced Platelet AB  0.256 OD    0.000-0.400  53

 

 Hepatitis C Antibody  Nonreactive Nonreactive      

 

 Signal/Cutoff ratio  0.65    <0.80  54









 Antineutrophil  2014  N2N/CCD Import  Atypical pANCA  <1:20 titer    Neg:
<1:20  55



 Cytoplasmic AB              









 Cytoplasmic (C-Anca)  <1:20 titer    Neg:<1:20  

 

 Perinuclear (P-Anca)  <1:20 titer    Neg:<1:20  56









 Hepatitis  2014  N2N/CCD Import  Hepatitis A  Nonreactive      57



 Evaluation      Antibody IgM  Nonreactive      









 Hepatitis B Core IgM  Nonreactive Nonreactive      58

 

 Hepatitis B Surface Antigen  Nonreactive Nonreactive      59

 

 Hepatitis C Antibody  Nonreactive Nonreactive      

 

 Signal/Cutoff ratio  0.65    <0.80  60









 Laboratory test  2014  N2N/CCD Import  C-Reactive  12.4 mg/L  High  0.0-
4.9  



 finding      Protein,Quant        









 Sedimentation Rate  10 mm/hr    0-20  61









 Laboratory test  2014  N2N/CCD Import  C-Reactive  See Note      62



 finding      Protein,Quant        









 Sedimentation Rate  See Note      63









 Laboratory test finding  2014  N2N/CCD Import  Abo/RH Type  A Neg      64









 LR Platelet Pheresis,Each Unit  See Note      65









 CBC/Manual Differential  2014  N2N/CCD Import  Atypical Lymph%  2 %    0-
7  









 Band%  3 %    0-8  

 

 Differential Comment  See Note      66

 

 Eosinophil%  5 %    0-5  

 

 Hematocrit  36.8 %  Low  38.0-48.0  

 

 Hemoglobin  12.0 gm/dL  Low  12.8-17.0  

 

 Lymph%  24 %    17-56  

 

 Mean Cell Volume  89.5 fl    80.0-96.0  

 

 Mean Corpuscular HGB  29.2 pg    27.0-33.0  

 

 Mean Corpuscular HGB Conc  32.6 g/dL    31.7-36.0  

 

 Mean Platelet Volume  13.4 fL  High  6.6-10.6  

 

 Monocyte%  7 %    0-10  

 

 Neutrophils%  59 %    33-73  

 

 Platelet Count  29 K/uL  Low  150-400  67

 

 Platelet Estimate  Marked Decrease      

 

 RBC Morphology  Normal      

 

 Red Blood Count  4.11 M/uL  Low  4.20-5.80  

 

 Red Cell Distri Width %CV  14.9 %    11.6-15.8  

 

 Total Cells Counted  100 #CELLS      

 

 White Blood Count  5.2 K/uL    3.4-10.5  









 Basic Metabolic Panel  2014  [facility] [facility]  Anion Gap  8 mEq/L    8-16  









 BUN  13 mg/dL    5-23  

 

 BUN/Creat  10.8 ratio      

 

 Calcium  8.2 mg/dL  Low  8.5-10.1  

 

 Carbon Dioxide  26 mEq/L    18-29  

 

 Chloride  112 mmol/L  High    

 

 Creatinine  1.2 mg/dL    0.5-1.4  

 

 Glom Filtration Rate, Estimate  >60 mL/min    >60  

 

 Glucose  83 mg/dL      

 

 If   >60 mL/min    >60  68

 

 Potassium  3.7 mmol/L    3.5-5.1  

 

 Sodium  142 mmol/L    136-145  









 Laboratory test  2014  [facility] [facility]  Platelet Count  29 K/uL  Low  150-
400  69



 finding              

 

 Comprehensive  2014  [employer] [facility]  Alb/Glob  1.0 ratio      



 Metabolic Panel              









 Albumin  3.4 g/dL  Low  3.5-5.0  

 

 Alkaline Phosphatase  66 U/L      

 

 Anion Gap  11 mEq/L    8-16  

 

 BUN  17 mg/dL    5-23  

 

 BUN/Creat  13.0 ratio      

 

 Bilirubin,Total  0.7 mg/dL    0.2-1.2  

 

 Calcium  8.3 mg/dL  Low  8.5-10.1  

 

 Carbon Dioxide  23 mEq/L    18-29  

 

 Chloride  111 mmol/L  High    

 

 Creatinine  1.3 mg/dL    0.5-1.4  

 

 Globulin  3.5 g/dL    1.9-4.3  

 

 Glom Filtration Rate, Estimate  58 mL/min    >60  

 

 Glucose  128 mg/dL  High    

 

 If   >60 mL/min    >60  70

 

 Potassium  3.5 mmol/L    3.5-5.1  

 

 SGPT/Alt  22 U/L  Low  30-65  

 

 Sgot/Ast  21 U/L    16-40  

 

 Sodium  141 mmol/L    136-145  

 

 Total Protein  6.9 g/dL    6.3-8.0  









 Laboratory test  2014  [employer] [facility]  Act Partial  27.5 s    23.9-34.3  



 finding      Thrombo Time        









 Bas%  0.5 %    0.1-1.0  

 

 Baso #  0.03 K/uL  Low  0.1-0.2  

 

 CK  62 U/L      

 

 D-Dimer, Quantitative  2.06 ug/mL  High    71

 

 Eo%  3.8 %    0.0-5.0  

 

 Eos #  0.24 K/uL    0.0-0.5  

 

 Fibrinogen  493 mg/dL  High  188-480  

 

 Hematocrit  39.1 %    38.0-48.0  

 

 Hemoglobin  12.9 gm/dL    12.8-17.0  

 

 Inr  1.0    0.9-1.1  72

 

 Lipase  209 U/L      

 

 Lymph #  1.61 K/uL    1.2-4.0  

 

 Lymph %  25.6 %    17.0-56.0  

 

 Mean Cell Volume  89.9 fl    80.0-96.0  

 

 Mean Corpuscular HGB  29.7 pg    27.0-33.0  

 

 Mean Corpuscular HGB Conc  33.0 g/dL    31.7-36.0  

 

 Mono #  0.48 K/uL    0.0-0.6  

 

 Mono %  7.6 %    0.0-10.0  

 

 Neut#  3.93 K/uL    1.8-7.0  

 

 Neut%  62.5 %    33.0-73.0  

 

 Platelet Count  30 K/uL  Low  150-400  

 

 Protime  13.0 s    12.1-14.9  

 

 Red Blood Count  4.35 M/uL    4.20-5.80  

 

 Red Cell Distri Width %CV  15.0 %    11.6-15.8  

 

 Red Cell Distri Width SD  48.5 fl    36-51  

 

 Troponin-I  < 0.02 ng/mL    0.00-0.50  73

 

 White Blood Count  6.3 K/uL    3.4-10.5  









 Urine Screen  2014  [employer] [facility]  Urine Bilirubin -  Negative    
Negative  



       Dipstick        









 Urine Blood  Negative    Negative  

 

 Urine Clarity  Clear    Clear  

 

 Urine Color  Yellow    Yellow  

 

 Urine Glucose - Dipstick  Negative mg/dL    Negative  

 

 Urine Ketone  Negative mg/dL    Negative  

 

 Urine Leuk Esterase  Negative    Negative  

 

 Urine Nitrite - Dipstick  Negative    Negative  

 

 Urine PH  6.5    6.5-7.5  

 

 Urine Protein - Dipstick  Negative mg/dL    Negative  

 

 Urine Specific Gravity  1.020    1.010-1.030  

 

 Urine Urobilinogen - Dipstick  0.2 E.U./dL    0.2-1.0  









 Comp Metabolic Panel  2014  [facility]  Albumin  3.6 g/dL    3.2-5.2
  









 Albumin/Globulin Ratio  1.6    1-3  

 

 Alkaline Phosphatase  58 U/L      

 

 Alt  21 U/L    14-54  

 

 Anion Gap  6.0 mmol/L    2-11  

 

 Ast  18 U/L    12-42  

 

 BUN/Creatinine Ratio  12.1    8-20  

 

 Blood Urea Nitrogen  17 mg/dL    6-24  

 

 Calcium  9.6 mg/dL    8.1-9.9  

 

 Chloride  103 mmol/L    101-111  

 

 Co2 Carbon Dioxide  32.0 mmol/L    22-32  

 

 Creatinine  1.40 mg/dL    0.50-1.40  

 

 Egfr   64.6    >60  74

 

 Egfr Non-  50.2    >60  

 

 Globulin  2.3 g/dL    2-4  

 

 Glucose  113 mg/dL  High    

 

 Potassium  3.4 mmol/L  Low  3.5-5.0  

 

 Sodium  141 mmol/L    133-145  

 

 Total Bilirubin  0.8 mg/dL    0.4-1.5  

 

 Total Protein  5.9 g/dL  Low  6.2-8.1  









 Lipid Profile  2014  [facility] Import  Cholesterol  157 mg/dL    Less than
  



 (Trig/Chol/HDL)            200  









 Cholesterol/HDL Ratio  5.2 Average  High  1-4.44  

 

 HDL Cholesterol  30 mg/dL  Low  40-60  75

 

 LDL Cholesterol  105.0  High  Less Than 100  76

 

 Triglycerides  110 mg/dL      









 CBC Auto Diff  2014  [facility] Import  Abs Basophils  0.1 10^3/uL    0-0.2
  









 Abs Eosinophils  0.3 10^3/uL    0-0.6  

 

 Abs Lymphocytes  1.5 10^3/uL    1.0-4.8  

 

 Abs Monocytes  0.6 10^3/uL    0-0.8  

 

 Abs Neutrophils  4.3 10^3/uL    1.5-7.7  

 

 Abs Nucleated RBC  0.01 10^3/uL      

 

 Basophil %  1.3 %    0-2  

 

 Eosinophil %  3.7 %    0-6  

 

 Granulocyte %  63.4 %    38-83  

 

 Hematocrit  38 %  Low  42-52  

 

 Hemoglobin  12.4 g/dL  Low  14.0-18.0  

 

 Lymphocyte %  22.0 %  Low  25-47  

 

 Mean Corpuscular HGB Conc  33 g/dL    31-36  

 

 Mean Corpuscular Hemoglobin  29 pg    27-31  

 

 Mean Corpuscular Volume  88 fL    80-94  

 

 Mean Platelet Volume  10 um3    7.4-10.4  

 

 Monocyte %  9.6 %  High  1-9  

 

 Nucleated Red Blood Cells %  0.1      

 

 Platelet Count  52 10^3/uL  Low  150-450  

 

 Red Blood Count  4.31 10^6/uL    4.0-5.4  

 

 Red Cell Distribution Width  16 %  High  10.5-15  

 

 White Blood Count  6.7 10^3/uL    4.8-10.8  









 Laboratory test  2014  N2N/CCD Import  Activated Partial  29.6 s    24.0-
36.1  



 finding      Thrombo Time        









 Inr  0.96    0.85-1.06  

 

 Pathologist Review  (See Note)      77

 

 TSH (Thyroid Stimulating Horm)  7.58 miu/mL  High  0.34-5.60  









 1  N18.3 N18.3 I10 E03.9

 

 2  Note:



   Persistent reduction for 3 months or more in an eGFR <60



   mL/min/1.73 m2 defines CKD.  Patients with eGFR values >/=60



   mL/min/1.73 m2 may also have CKD if evidence of persistent



   proteinuria is present.



   



   The original MDRD equation for estimated GFR is not valid



   for patients less than 18 years of age.



   



   Additional information may be found at www.kdoqi.org.

 

 3  INFCE Result Units: mg/g creat



   Performed at:  [address]  [id number]



   : Araceli B Reyes MD, Phone:  [phone]

 

 4  URINE, CLEAN CATCH

 

 5  Vitamin D deficiency has been defined by the [place] of



   Medicine and an Endocrine Society practice guideline as a



   level of serum 25-OH vitamin D less than 20 ng/mL (1,2).



   The Endocrine Society went on to further define vitamin D



   insufficiency as a level between 21 and 29 ng/mL (2).



   1. IOM ([place] of Medicine). 2010. Dietary reference



   intakes for calcium and D. [place]: The



   National Academies Press.



   2. Tresa MF, Logan NC, Valarie ADAIR, et al.



   Evaluation, treatment, and prevention of vitamin D



   deficiency: an Endocrine Society clinical practice



   guideline. JCEM. 2011; 96(7):1911-30.



   Performed at:  [address]  [id number]



   : Araceli B Reyes MD, Phone:  [phone]

 

 6  N18.3

 

 7  Performed at:  [address]  [id number]



   : Araceli B Reyes MD, Phone:  [phone]



   Performed at:  [address]  [id number]



   : Araceli B Reyes MD, Phone:  [phone]

 

 8  Note:



   Persistent reduction for 3 months or more in an eGFR <60



   mL/min/1.73 m2 defines CKD.  Patients with eGFR values >/=60



   mL/min/1.73 m2 may also have CKD if evidence of persistent



   proteinuria is present.



   



   The original MDRD equation for estimated GFR is not valid



   for patients less than 18 years of age.



   



   Additional information may be found at www.kdoqi.org.

 

 9  Vitamin D deficiency has been defined by the [place] of



   Medicine and an Endocrine Society practice guideline as a



   level of serum 25-OH vitamin D less than 20 ng/mL (1,2).



   The Endocrine Society went on to further define vitamin D



   insufficiency as a level between 21 and 29 ng/mL (2).



   1. IOM ([place] of [employer]). 2010. Dietary reference



   intakes for calcium and D. [place]: The



   National Academies Press.



   2. Tresa MF, Logan NC, Valarie ADAIR, et al.



   Evaluation, treatment, and prevention of vitamin D



   deficiency: an Endocrine Society clinical practice



   guideline. JCEM. 2011; 96(7):1911-30.



   Performed at:   - [facility] [address]  [id number]



   : Araceli B Reyes MD, Phone:  [phone]

 

 10  INFCE Result Units: mg/g creat



   Performed at:   - [facility]  [id number]



   : Araceli B Reyes MD, Phone:  [phone]

 

 11  Performed at:  [facility] [address]  [id number]



   : Araceli B Reyes MD, Phone:  [phone]

 

 12  Note:



   Persistent reduction for 3 months or more in an eGFR <60



   mL/min/1.73 m2 defines CKD.  Patients with eGFR values >/=60



   mL/min/1.73 m2 may also have CKD if evidence of persistent



   proteinuria is present.



   



   The original MDRD equation for estimated GFR is not valid



   for patients less than 18 years of age.



   



   Additional information may be found at www.kdoqi.org.

 

 13  N18.3 N18.3 I10 N18.3 I10 N18.3 I10

 

 14  Vitamin D deficiency has been defined by the [place] of



   Medicine and an Endocrine Society practice guideline as a



   level of serum 25-OH vitamin D less than 20 ng/mL (1,2).



   The Endocrine Society went on to further define vitamin D



   insufficiency as a level between 21 and 29 ng/mL (2).



   1. IOM ([place] of [employer]). 2010. Dietary reference



   intakes for calcium and D. [place]: The



   National Academies Press.



   2. Tresa MF, Logan HERR, Valarie ADAIR, et al.



   Evaluation, treatment, and prevention of vitamin D



   deficiency: an Endocrine Society clinical practice



   guideline. JCEM. 2011; 96(7):1911-30.



   Performed at:  [facility] [employer]  [id number]



   : Araceli B Reyes MD, Phone:  [phone]

 

 15  Note:



   Persistent reduction for 3 months or more in an eGFR <60



   mL/min/1.73 m2 defines CKD.  Patients with eGFR values >/=60



   mL/min/1.73 m2 may also have CKD if evidence of persistent



   proteinuria is present.



   



   The original MDRD equation for estimated GFR is not valid



   for patients less than 18 years of age.



   



   Additional information may be found at www.kdoqi.org.

 

 16  URINE, CLEAN CATCH

 

 17  INFCE Result Units: mg/g creat



   Performed at:  [facility] [employer]  [id number]



   : Araceli B Reyes MD, Phone:  [phone]

 

 18  Performed at:  [facility] [employer]  [id number]



   : Araceli B Reyes MD, Phone:  [phone]

 

 19  Note:



   Persistent reduction for 3 months or more in an eGFR <60



   mL/min/1.73 m2 defines CKD.  Patients with eGFR values >/=60



   mL/min/1.73 m2 may also have CKD if evidence of persistent



   proteinuria is present.



   



   The original MDRD equation for estimated GFR is not valid



   for patients less than 18 years of age.



   



   Additional information may be found at www.kdoqi.org.

 

 20  E03.9 N18.3 I10

 

 21  Note:



   Persistent reduction for 3 months or more in an eGFR <60



   mL/min/1.73 m2 defines CKD.  Patients with eGFR values >/=60



   mL/min/1.73 m2 may also have CKD if evidence of persistent



   proteinuria is present.



   



   The original MDRD equation for estimated GFR is not valid



   for patients less than 18 years of age.



   



   Additional information may be found at www.kdoqi.org.

 

 22  N18.3

 

 23  Note:



   Persistent reduction for 3 months or more in an eGFR <60



   mL/min/1.73 m2 defines CKD.  Patients with eGFR values >/=60



   mL/min/1.73 m2 may also have CKD if evidence of persistent



   proteinuria is present.



   



   The original MDRD equation for estimated GFR is not valid



   for patients less than 18 years of age.



   



   Additional information may be found at www.kdoqi.org.

 

 24  Elevated levels of HbA1c suggest the need for more



   aggressive treatment of glycemia.



   



   The American Diabetes Association recommends that a primary



   goal of therapy should be a HbA1c of <7% and that physicians



   should re-evaluate the treatment regimen in patients with



   HbA1c values consistently >8%.

 

 25  N18.3 N18.3 I10 E03.9

 

 26  URINE, CLEAN CATCH

 

 27  INFCE Result Units: mg/g creat



   



   Performed at:  RN - [facility] [address]  [id number]



   : Araceli B Reyes MD, Phone:  [phone]

 

 28  I10 R94.4

 

 29  Note:



   Persistent reduction for 3 months or more in an eGFR <60



   mL/min/1.73 m2 defines CKD.  Patients with eGFR values >/=60



   mL/min/1.73 m2 may also have CKD if evidence of persistent



   proteinuria is present.



   



   The original MDRD equation for estimated GFR is not valid



   for patients less than 18 years of age.



   



   Additional information may be found at www.kdoqi.org.

 

 30  R94.4

 

 31  Note:



   Persistent reduction for 3 months or more in an eGFR <60



   mL/min/1.73 m2 defines CKD.  Patients with eGFR values >/=60



   mL/min/1.73 m2 may also have CKD if evidence of persistent



   proteinuria is present.



   



   The original MDRD equation for estimated GFR is not valid



   for patients less than 18 years of age.



   



   Additional information may be found at www.kdoqi.org.

 

 32  I10

 

 33  Note:



   Persistent reduction for 3 months or more in an eGFR <60



   mL/min/1.73 m2 defines CKD.  Patients with eGFR values >/=60



   mL/min/1.73 m2 may also have CKD if evidence of persistent



   proteinuria is present.



   



   The original MDRD equation for estimated GFR is not valid



   for patients less than 18 years of age.



   



   Additional information may be found at www.kdoqi.org.

 

 34  Reference Guidelines*:



   Desirable: ........... < 200 mg/dL



   Borderline High: ..... 200-239 mg/dL



   High: ................ >=240 mg/dL



   



   * The National Cholesterol Education Program (NCEP)

 

 35  Reference Guidelines*:



   Normal: ............. < 150 mg/dL



   Borderline High: .... 150-199 mg/dL



   High: ............... 200-499 mg/dL



   Very High: .......... > 500 mg/dL



   * Source: National Cholesterol Education Program (NCEP)

 

 36  Reference Guidelines*:



   Low HDL: ..... < 40 mg/dL



   Normal:  ..... 40-60 mg/dL



   Desirable: ... > 60 mg/dL



   



   *The National Cholesterol Education Program(NCEP)

 

 37  Reference Guidelines*:



   Optimal:........... <100 mg/dL



   Near Optimal....... 100-129 mg/dL



   Borderline High.... 130-159 mg/dL



   High............... 160-189 mg/dL



   Very High.......... >=190 mg/dL



   * Source: National Cholesterol Education Program (NCEP)

 

 38  Performed at:  BN - [facility] [address]  [id number]



   : Josiah Riggs MD, Phone:  [phone]

 

 39  Note:



   Persistent reduction for 3 months or more in an eGFR <60



   mL/min/1.73 m2 defines CKD.  Patients with eGFR values >/=60



   mL/min/1.73 m2 may also have CKD if evidence of persistent



   proteinuria is present.



   The original MDRD equation for estimated GFR is not valid



   for patients less than 18 years of age.



   Additional information may be found at www.kdoqi.org.

 

 40  Reference Guidelines*:



   Desirable: ........... < 200 mg/dL



   Borderline High: ..... 200-239 mg/dL



   High: ................ >=240 mg/dL



   * The National Cholesterol Education Program (NCEP)

 

 41  Reference Guidelines*:



   Normal: ............. < 150 mg/dL



   Borderline High: .... 150-199 mg/dL



   High: ............... 200-499 mg/dL



   Very High: .......... > 500 mg/dL



   * Source: National Cholesterol Education Program (NCEP)

 

 42  Reference Guidelines*:



   Low HDL: ..... < 40 mg/dL



   Normal:  ..... 40-60 mg/dL



   Desirable: ... > 60 mg/dL



   *The National Cholesterol Education Program(NCEP)

 

 43  Reference Guidelines*:



   Optimal:........... <100 mg/dL



   Near Optimal....... 100-129 mg/dL



   Borderline High.... 130-159 mg/dL



   High............... 160-189 mg/dL



   Very High.......... >=190 mg/dL



   * Source: National Cholesterol Education Program (NCEP)

 

 44  Adult male reference interval is based on a population of lean males up to 
40



   years old.

 

 45  Performed at: [address] [id number] 
Lab



   Director: Araceli B Reyes MD, Phone: [phone] Performed at: [facility] [id number] : Josiah Riggs MD, Phone: [phone]

 

 46  Negative <1:80 Borderline 1:80 Positive >1:80 Performed at: [facility] [id number] : Araceli B Reyes MD, 
Phone:



   [phone]

 

 47  Note: Persistent reduction for 3 months or more in an eGFR <60 mL/min/1.73 
m2



   defines CKD. Patients with eGFR values >/=60 mL/min/1.73 m2 may also have 
CKD if



   evidence of persistent proteinuria is present. The original MDRD equation for



   estimated GFR is not valid for patients less than 18 years of age. Additional



   information may be found at www.kdoqi.org.

 

 48  Negative <1:80 Borderline 1:80 Positive >1:80

 

 49  INFCE Result Units: mg/dL Adult

 

 50  INFCE Result Units: mg/dL Adult Performed at: [address] [id number] : Josiah Riggs MD, Phone:



   [phone] Performed at: [address]



   [id number] : Araceli B Reyes MD, Phone: [phone]

 

 51  Performed at: [address] [id number] 
Lab



   Director: Araceli B Reyes MD, Phone: [phone]

 

 52  Negative <0.91 Equivocal 0.91 - 1.09 Positive >1.09 Performed at: [address] [id number] : Araceli B Reyes MD,



   Phone: [phone]

 

 53  Performed at: [address] 
[id number]



   : Josiah Riggs MD, Phone: [phone]

 

 54  Antibodies to HCV not detected; does not exclude early acute HCV infection.

 

 55  The atypical pANCA pattern has been observed in a significant percentage of



   patients with ulcerative colitis, primary sclerosing cholangitis and 
autoimmune



   hepatitis.

 

 56  The presence of positive fluorescence exhibiting P-ANCA or C-ANCA patterns 
alone



   is not specific for the diagnosis of Wegener's Granulomatosis (WG) or 
microscopic



   polyangiitis. Decisions about treatment should not be based solely on ANCA 
IFA



   results. The International ANCA Group Consensus recommends follow up testing 
of



   positive sera with both VA- 3 and MPO-ANCA enzyme immunoassays. As many as 5%



   serum samples are positive only by EIA. Ref. AM J Clin Pathol 1999;111:507-
513.

 

 57  IgM antibodies to HAV not detected; does not exclude early acute or 
recovered HAV



   infection.

 

 58  IgM anti-HBc not detected. Does not exclude the possibility of exposure to 
or



   infection with HBV.

 

 59  HBsAg not detected; does not exclude the possibility of exposure to or 
early acute



   infections with HBV.

 

 60  Antibodies to HCV not detected; does not exclude early acute HCV infection.

 

 61  **CALLED PLT TO TYSHAWN ESQUIVEL AT 0740 14 by LAB.OPT**

 

 62  MERGING

 

 63  MERGING

 

 64  **CALLED LICHA AT 1136 14 by LAB.AJP** 1 PL READY **CALLED PALMER 
AT 0953



   14 by LAB.AJP** NOTIFIED PL IS COMING STAT FROM [place] [facility] 
Consent



   obtained? Y

 

 65  -------- Issue -------- Unit # Bld Type Product Status Date Time User Rsrvd



   -----------------------------------------------------------------------------
-----



   ---------- [id number] A POS LR PLAT PHER PRSMD TRFSD 14 1315 
[email or web address]



   Unit Satisfactory? No Apparent Contamination Volume: 250 TX Begin: 1315



   By AutoDft TX End:  By AutoDft



   -----------------------------------------------------------------------------
-----



   ----------

 

 66  LARGE PLATELETS PRES

 

 67  CHECKED,PLT EST. AGREES WITH INSTRUMENT VALUE.

 

 68  Note: Persistent reduction for 3 months or more in an eGFR <60 mL/min/1.73 
m2



   defines CKD. Patients with eGFR values >/=60 mL/min/1.73 m2 may also have 
CKD if



   evidence of persistent proteinuria is present. The original MDRD equation for



   estimated GFR is not valid for patients less than 18 years of age. Additional



   information may be found at www.kdoqi.org.

 

 69  **CALLED MAY J AT 0932 14 by LAB.AJP** Comments to phlebotomist: 
PLEASE CALL



   TO [phone] [id number]  Note: Persistent reduction for 3 months or more in an eGFR <60 mL/min/1.73 
m2



   defines CKD. Patients with eGFR values >/=60 mL/min/1.73 m2 may also have 
CKD if



   evidence of persistent proteinuria is present. The original MDRD equation for



   estimated GFR is not valid for patients less than 18 years of age. Additional



   information may be found at www.kdoqi.org.

 

 71  <=0.49 ug/mL - Low likelihood of DIC, DVT or Pulmonary Embolism >0.49 ug/
mL -



   Additional testing should be done to rule out DIC, DVT, or Pulmonary 
embolism as



   clinically indicated. ([facility] has established a 
97.89%



   negative predictive value for thrombotic disease when a cutoff value of 0.5 
ug/mL



   is used.)

 

 72  THERAPEUTIC INR RANGE: 2.0 - 3.0 DVT, Pulmonary embolus, prophylaxis 
against



   venous thrombosis or systemic embolization in high risk patients. 2.5 - 3.5



   Mechanical heart valves

 

 73  0 - 0.5 ng/mL: No evidence of myocardial injury 0.6 - 1.4 ng/mL: Mild 
elevation,



   suggesting possible myocardial injury > 1.4 ng/mL: Consistent with myocardial



   injury

 

 74  *******Because ethnic data is not always readily available, this report 
includes



   an eGFR for both -Americans and non- Americans.**** The 
National



   Kidney Disease Education Program (NKDEP) does not endorse the use of the MDRD



   equation for patients that are not between the ages of 18 and 70, are 
pregnant,



   have extremes of body size, muscle mass, or nutritional status, or are



   non- or non-. According to the National Kidney



   Foundation, irrespective of diagnosis, the stage of the disease is based on 
the



   level of kidney function: Stage Description GFR(mL/min/1.73 m(2)) 1 Kidney 
damage



   with normal or decreased GFR 90 2 Kidney damage with mild decrease in GFR 60-
89 3



   Moderate decrease in GFR 30-59 4 Severe decrease in GFR 15-29 5 Kidney 
failure <15



   (or dialysis)

 

 75  HDL Interpretation: Undesirable: High Risk: Less than 40 mg/dL Desirable: 
Low



   Risk: Greater than 60 mg/dL

 

 76  LDL Interpretation: Low Risk Optimal Level: LDL Less than 100 mg/dL Near 
or Above



   Optimal: -129 mg/dL Borderline High Risk: -159 mg/dL High Risk
: LDL



   160-189 mg/dL Very High Risk: LDL Greater than 189 mg/dL

 

 77  REVIEWED BY SANGITA HAMMONDS MD CBC and smear reviewed. Thrombocytopenia 
present



   without evidence of platelet clumping or satellitosis. CBC and smear 
reviewed.



   Normochromic normocytic anemia consistent with chronic disease or acute blood



   loss.







Procedures







 Date  Code  Description  Status

 

 10/22/2018  19431  EGD With Biopsy  Completed

 

 2018  95653  EGD With Biopsy  Completed

 

 2016  87832  Colonoscopy  Completed

 

 2016  [id number]  Colonoscopy  Completed

 

 2003  [id number]  Colonoscopy  Completed







Encounters







 Type  Date  Location  Provider  Dx  Diagnosis

 

 Office Visit  2019  Herve Ray MD  K21.9  Gastro-esophageal



   8:30a        reflux disease without



           esophagitis









 K59.00  Constipation, unspecified









 Office Visit  2019  9:40a  Primary Care  Daxa Chin,  N18.3  Chronic 
kidney



     Office  MD    disease, stage 3



           (moderate)









 I10  Essential (primary) hypertension









 Office Visit  2018  2:45p  Surgical Office  Julius Kee R12 Heartburn MD, FACS    

 

 Office Visit  2018  2:45p  Family Medicine  adeel  Z23  Encounter for



     [facility]



       FNP    









 I10  Essential (primary) hypertension

 

 K21.0  Gastro-esophageal reflux disease with esophagitis

 

 E03.9  Hypothyroidism, unspecified

 

 Z23  Encounter for immunization









 Office Visit  2018  8:30a  Herve Ray MD  K44.9  Diaphragmatic 
hernia



           without obstruction or



           gangrene









 K21.9  Gastro-esophageal reflux disease without esophagitis









 Office Visit  10/10/2018 10:20a  Primary Care  Daxa Chin  N18.3  Chronic 
kidney



     Office  MD    disease, stage 3



           (moderate)









 I10  Essential (primary) hypertension

 

 E83.52  Hypercalcemia

 

 E03.9  Hypothyroidism, unspecified









 Office Visit  2018  Surgical  [place]  K21.0  Gastro-esophageal



   10:00a  Office  Julius    reflux disease with



       DEON AMIN    esophagitis

 

 Office Visit  2018  MARIAM Ackerman  K44.9  Diaphragmatic hernia



   8:30a    MD Herve    without obstruction or



           gangrene









 K21.0  Gastro-esophageal reflux disease with esophagitis









 Office Visit  2018  8:45a  Herve Ray MD  K22.10  Ulcer of 
esophagus



           without bleeding









 K44.9  Diaphragmatic hernia without obstruction or gangrene









 Office Visit  05/10/2018 11:00a  Herve Ray,  R12  Heartburn



       MD    

 

 Office Visit  2018 10:30a  Family Medicine  [place],  K21.9  Gastro-
esophageal



     [address]  Jenniferleielvis,    reflux disease



       FNP    without



           esophagitis

 

 Office Visit  2018 11:40a  Primary Care  Daxa Chin,  I10  Essential



     Office  MD    (primary)



           hypertension









 N18.3  Chronic kidney disease, stage 3 (moderate)

 

 E83.52  Hypercalcemia

 

 K21.9  Gastro-esophageal reflux disease without esophagitis









 Office Visit  2018  3:00p  Primary Care  Daxa Chin,  N18.3  Chronic 
kidney



     Office  MD    disease, stage 3



           (moderate)









 I10  Essential (primary) hypertension









 Office  2018  Family  [place],  K21.9  Gastro-esophageal



 Visit  10:15a  Medicine CRISTOFER Zarco    reflux disease



     RD      without esophagitis









 K59.00  Constipation, unspecified









 Office  2018  Family  [place],  K21.9  Gastro-esophageal



 Visit  8:30a  Medicine CRISTOFER Zarco    reflux disease



     RD      without esophagitis









 N18.3  Chronic kidney disease, stage 3 (moderate)

 

 K59.00  Constipation, unspecified









 Office Visit  05/10/2017  1:20p  Primary Care  Daxa Chin,  I10  Essential (
primary)



     Office  MD    hypertension









 K21.9  Gastro-esophageal reflux disease without esophagitis

 

 N18.3  Chronic kidney disease, stage 3 (moderate)









 Office Visit  2016  Family  [place],  I10  Essential



   1:00p  Medicine CRISTOFER Zarco    (primary)



     RD      hypertension









 E03.9  Hypothyroidism, unspecified

 

 K21.9  Gastro-esophageal reflux disease without esophagitis

 

 Z12.2  Encntr screen for malignant neoplasm of respiratory organs

 

 F17.290  Nicotine dependence, other tobacco product, uncomplicated

 

 Z23  Encounter for immunization









 Office  2016  Family  [place],  K21.Funmi  Gastro-esophageal



 Visit  1:00p  Medicine Yonathan Mcclain FNP    reflux disease



     RD      without esophagitis









 E03.9  Hypothyroidism, unspecified

 

 I10  Essential (primary) hypertension









 Office Visit  2016  Family  [place],  M25.511  Pain in right



   1:30p  Medicine [place]  CRISTOFER Mcclain    shoulder



     RD      









 K21.9  Gastro-esophageal reflux disease without esophagitis

 

 E03.9  Hypothyroidism, unspecified









 Office Visit  2016  1:30p  Surgical Office  Cecily  Z12.11  
Encounter for



       Phuc CUNHA    screening for



       M.D.    malignant



           neoplasm of



           colon









 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









 Office Visit  2015  Surgical  Cecily  Z12.11  Encounter for



   8:30a  Office  Phuc CUNHA    screening for



       M.D.    malignant neoplasm



           of colon

 

 Office Visit  10/22/2015  Family [place]  I10  Essential



   2:00p  [facility] [place]  [facility]



     RD  FNP    hypertension









 Z12.11  Encounter for screening for malignant neoplasm of colon

 

 Z76.0  Encounter for issue of repeat prescription

 

 E03.9  Hypothyroidism, unspecified

 

 Z23  Encounter for immunization









 Office Visit  2015  Family [place],  401.1  Hypertension



   1:00p  Medicine [place]  CRISTOFER Mcclain    Benign



     RD      









 244.9  Hypothyroidism Other Unspec

 

 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









 Office Visit  2014  Nick Gutierrez,  287.5  Thrombocytopenia



   12:34p  Shakira Law M.D.    [facility]      









 244.9  Hypothyroidism Other Unspec







Plan of Treatment

Future Appointment(s):2019  9:30 am - Johny Webster FNP at [facility]2019 10:00 am - Herve Ackerman MD at GI10/ 10:20 
am - Daxa Chin MD at Primary Care [id number] - Johny Webster FNPR15.2 Fecal urgencyComments:I recommend discussing with Dr. AckermanK21.9 
Gastro-esophageal reflux disease without esophagitisNew Medication:Omeprazole 
20 mg - 1 by mouth every dayComments:will get clarification about cutting back 
on CenzlhyyhkT36.3 Chronic kidney disease, stage 3 (moderate)Comments:reviewed 
labs as above stable -I10 Essential (primary) hypertensionComments:well 
controlled, perhaps TOO well controlledlow blood pressure can lead to 
lightheadedness, dizziness and increased risk for fallsWill need to track 
closelyFollow up:3 months f/u HTN &amp; hypothyroidism - TSH a week [id number] 
Hypothyroidism, unspecifiedNew Labs:Thyroid Stim Hormone, Scheduled: Comments:TSH is at upper edge of normal.  Will repeat labs in 3 months.

## 2019-06-26 NOTE — UC
Cardiac HPI





- HPI Summary


HPI Summary: 


79-year-old male comes in with a chief complaint of left arm pain.  At about 7 

PM this evening which is 45 minutes ago he started with left arm pain last 

about 20 minutes.  It is gradually going away on its own.  Denies any nausea or 

sweating or shortness of breath.  He was wondering if this might be his heart.





- History of Current Complaint


Stated Complaint: LEFT ARM PAIN


Time Seen by Provider: 06/26/19 19:46





- Allergy/Home Medications


Allergies/Adverse Reactions: 


 Allergies











Allergy/AdvReac Type Severity Reaction Status Date / Time


 


celecoxib [From Celebrex] Allergy Unknown recommended Verified 06/26/19 20:06





   to avoid  


 


ketorolac [From Toradol] Allergy Unknown recommended Verified 06/26/19 20:06





   to avoid  


 


NSAIDS (Non-Steroidal Allergy Unknown recommended Verified 06/26/19 20:06





Anti-Inflamma   to avoid  











Home Medications: 


 Home Medications





Hydrochlorothiazide TAB* [Hydrodiuril TAB*] 25 mg PO DAILY 06/26/19 [History 

Confirmed 06/26/19]


Levothyroxine TAB* [Synthroid TAB*] 75 mcg PO DAILY 06/26/19 [History Confirmed 

06/26/19]


Losartan TAB* [Cozaar TAB*] 50 mg PO DAILY 06/26/19 [History Confirmed 06/26/19]


Metoprolol Tartrate TAB* [Lopressor TAB*] 50 mg PO DAILY 06/26/19 [History 

Confirmed 06/26/19]


Omeprazole CAP (NF) [Prilosec CAP* 20 MG] 20 mg PO DAILY 06/26/19 [History 

Confirmed 06/26/19]


Ranitidine TAB (NF) [Zantac TAB (NF)] 150 mg PO DAILY 06/26/19 [History 

Confirmed 06/26/19]


Sildenafil Citrate [Viagra] PRN 06/26/19 [History]











PMH/Surg Hx/FS Hx/Imm Hx


Previously Healthy: Yes


Cardiovascular History: Hypertension





- Family History


Known Family History: Positive: Non-Contributory





Review of Systems


All Other Systems Reviewed And Are Negative: Yes


Constitutional: Positive: Negative


Skin: Positive: Negative


Eyes: Positive: Negative


ENT: Positive: Negative


Respiratory: Positive: Negative


Cardiovascular: Positive: Other - SEE HPI


Gastrointestinal: Positive: Negative


Motor: Positive: Negative


Neurovascular: Positive: Negative


Musculoskeletal: Positive: Negative


Neurological: Positive: Negative


Psychological: Positive: Negative


Is Patient Immunocompromised?: No





Physical Exam


Triage Information Reviewed: Yes


Appearance: Well-Appearing, No Pain Distress, Well-Nourished


Vital Signs Reviewed: Yes


Eye Exam: Normal


Eyes: Positive: Conjunctiva Clear


Neck: Positive: Supple


Respiratory: Positive: Lungs clear, Normal breath sounds, No respiratory 

distress


Cardiovascular: Positive: RRR


Musculoskeletal: Positive: Strength Intact, ROM Intact, Other: - NL B/L RADIAL 

PULSES. ARMS FROM.


Neurological: Positive: Alert, Muscle Tone Normal


Psychological Exam: Normal


Psychological: Positive: Normal Response To Family, Age Appropriate Behavior


Skin Exam: Normal





Diagnostics





- EKG


Cardiac Rate: NL - AT 1746


Cardiac Rhythm: Sinus: Normal - 63BPM


Ectopy: None


ST Segment: Normal





- Assessment/Plan


Course Of Treatment: 


Patient transferred to the [facility] emergency department for cardiac evaluation 

by ambulance.  I spoke with a medical provider at the [facility] emergency 

department. Aspirin 324mg po given in clinic.





- Clinical Impression


Provider Diagnosis: 


 Left arm pain








Discharge





- Sign-Out/Discharge


Documenting (check all that apply): Patient Departure


All imaging exams completed and their final reports reviewed: No Studies





- Discharge Plan


Condition: Stable


Disposition: TRANS HIGHER LVL OF CARE FAC





- Billing Disposition and Condition


Condition: STABLE


Disposition: Trans Higher Lvl of Care Fac